# Patient Record
Sex: MALE | Race: WHITE | NOT HISPANIC OR LATINO | URBAN - METROPOLITAN AREA
[De-identification: names, ages, dates, MRNs, and addresses within clinical notes are randomized per-mention and may not be internally consistent; named-entity substitution may affect disease eponyms.]

---
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
## 2020-11-24 NOTE — ED STATDOCS - PSH
Spoke with patient  Notified patient of neg glucose testing results.  Pt advised to follow-up as planned.  Pt Verbalized understanding.     No significant past surgical history